# Patient Record
Sex: MALE | Race: WHITE | NOT HISPANIC OR LATINO
[De-identification: names, ages, dates, MRNs, and addresses within clinical notes are randomized per-mention and may not be internally consistent; named-entity substitution may affect disease eponyms.]

---

## 2017-01-18 ENCOUNTER — CHARTING TRANS (OUTPATIENT)
Dept: OTHER | Age: 37
End: 2017-01-18

## 2018-11-05 VITALS
SYSTOLIC BLOOD PRESSURE: 132 MMHG | WEIGHT: 170 LBS | BODY MASS INDEX: 26.68 KG/M2 | RESPIRATION RATE: 98 BRPM | DIASTOLIC BLOOD PRESSURE: 84 MMHG | HEIGHT: 67 IN | HEART RATE: 70 BPM | TEMPERATURE: 98.8 F

## 2021-07-06 ENCOUNTER — OFFICE VISIT (OUTPATIENT)
Dept: URGENT CARE | Age: 41
End: 2021-07-06

## 2021-07-06 VITALS
SYSTOLIC BLOOD PRESSURE: 122 MMHG | OXYGEN SATURATION: 99 % | TEMPERATURE: 97.9 F | WEIGHT: 190 LBS | HEIGHT: 67 IN | HEART RATE: 70 BPM | BODY MASS INDEX: 29.82 KG/M2 | DIASTOLIC BLOOD PRESSURE: 78 MMHG

## 2021-07-06 DIAGNOSIS — B96.89 ACUTE BACTERIAL SINUSITIS: Primary | ICD-10-CM

## 2021-07-06 DIAGNOSIS — J01.90 ACUTE BACTERIAL SINUSITIS: Primary | ICD-10-CM

## 2021-07-06 PROCEDURE — 99203 OFFICE O/P NEW LOW 30 MIN: CPT | Performed by: NURSE PRACTITIONER

## 2021-07-06 RX ORDER — DOXYCYCLINE HYCLATE 100 MG/1
CAPSULE ORAL
Qty: 14 CAPSULE | Refills: 0 | Status: SHIPPED | OUTPATIENT
Start: 2021-07-06

## 2021-07-06 ASSESSMENT — ENCOUNTER SYMPTOMS
DIZZINESS: 0
ABDOMINAL PAIN: 0
SINUS PAIN: 1
SINUS PRESSURE: 1
HEADACHES: 1
VOMITING: 0
FATIGUE: 0
FEVER: 0
NAUSEA: 0

## 2021-07-14 ENCOUNTER — TELEPHONE (OUTPATIENT)
Dept: URGENT CARE | Age: 41
End: 2021-07-14

## 2021-07-14 ENCOUNTER — TELEPHONE (OUTPATIENT)
Dept: SCHEDULING | Age: 41
End: 2021-07-14

## 2021-07-14 DIAGNOSIS — R69 DIAGNOSIS DEFERRED: Primary | ICD-10-CM

## 2021-08-13 PROBLEM — R82.994 HYPERCALCIURIA: Status: ACTIVE | Noted: 2019-03-18

## 2021-08-13 PROBLEM — R82.993 HYPERURICOSURIA: Status: ACTIVE | Noted: 2019-03-18

## 2021-08-13 PROBLEM — I10 ESSENTIAL HYPERTENSION: Status: ACTIVE | Noted: 2020-07-10

## 2021-08-13 PROBLEM — Z86.69 HISTORY OF CHOLESTEATOMA: Chronic | Status: ACTIVE | Noted: 2021-08-13

## 2021-08-13 PROBLEM — Z87.442 HISTORY OF KIDNEY STONES: Chronic | Status: ACTIVE | Noted: 2021-08-13

## 2021-08-16 PROBLEM — E78.01 FAMILIAL HYPERCHOLESTEROLEMIA: Status: ACTIVE | Noted: 2021-08-16

## 2021-09-24 ENCOUNTER — NURSE ONLY (OUTPATIENT)
Dept: HEMATOLOGY/ONCOLOGY | Facility: HOSPITAL | Age: 41
End: 2021-09-24
Attending: GENETIC COUNSELOR, MS
Payer: COMMERCIAL

## 2021-09-24 ENCOUNTER — GENETICS ENCOUNTER (OUTPATIENT)
Dept: GENETICS | Facility: HOSPITAL | Age: 41
End: 2021-09-24
Attending: GENETIC COUNSELOR, MS
Payer: COMMERCIAL

## 2021-09-24 PROCEDURE — 96040 HC GENETIC COUNSELING EA 30 MIN: CPT | Performed by: GENETIC COUNSELOR, MS

## 2021-09-24 PROCEDURE — 36415 COLL VENOUS BLD VENIPUNCTURE: CPT

## 2021-09-24 NOTE — PROGRESS NOTES
Referring Provider:  Basim Ling MD    Reason for Referral:  Dali Brooks was referred for genetic counseling because of a personal history of hypercholesterolemia. Mr. Carli Villa is a 37year-old man of [place] [facility] and [place].   His blood wa aunts recently  at age 80 from complications of Alzheimer’s. Mr. Elayne Sales other paternal aunt is living in her early 80s.   Mr. Elayne Sales paternal grandmother  in her late [de-identified] and had some dementia and a history of stroke attributed to atrial fibrillati genetic test, or that the family is dealing with a genetic syndrome involving a different gene. In this scenario, all of Mr. Gladys Strong first- and second-degree relatives would need to be evaluated by a physician familiar with FH.   Assessment of their risk f responsible for an individual’s increased cancer risk. If Mr. Hakan Olivo is found to carry a pathogenic variant in a cancer predisposition gene, he is at significantly increased risk for various cancers.  The magnitude of these risks, and the cancers for wh

## 2021-10-13 ENCOUNTER — GENETICS ENCOUNTER (OUTPATIENT)
Dept: HEMATOLOGY/ONCOLOGY | Facility: HOSPITAL | Age: 41
End: 2021-10-13

## 2021-10-13 NOTE — PROGRESS NOTES
Referring Provider: Daniel Jones MD    Reason for Referral:  Carter Lundy had genetic testing performed on 9/24/21 because of a personal history of hypercholesterolemia and a family history of cancer.      Genetic Testing Result:  No known pathogenic variant testing that would apply to him or to inform me if there are any changes to the family history.     In the meantime, Mr. Jonelle Morgan and his relatives should speak with their physicians to discuss recommended medical management according to their personal and fam